# Patient Record
Sex: MALE | URBAN - METROPOLITAN AREA
[De-identification: names, ages, dates, MRNs, and addresses within clinical notes are randomized per-mention and may not be internally consistent; named-entity substitution may affect disease eponyms.]

---

## 2024-11-07 ENCOUNTER — OFFICE VISIT (OUTPATIENT)
Dept: URBAN - METROPOLITAN AREA CLINIC 19 | Facility: CLINIC | Age: 54
End: 2024-11-07
Payer: COMMERCIAL

## 2024-11-07 ENCOUNTER — DASHBOARD ENCOUNTERS (OUTPATIENT)
Age: 54
End: 2024-11-07

## 2024-11-07 VITALS
BODY MASS INDEX: 24.37 KG/M2 | TEMPERATURE: 98.4 F | SYSTOLIC BLOOD PRESSURE: 120 MMHG | HEIGHT: 72 IN | DIASTOLIC BLOOD PRESSURE: 70 MMHG | WEIGHT: 179.9 LBS

## 2024-11-07 DIAGNOSIS — R13.12 OROPHARYNGEAL DYSPHAGIA: ICD-10-CM

## 2024-11-07 PROBLEM — 71457002: Status: ACTIVE | Noted: 2024-11-07

## 2024-11-07 PROCEDURE — 994 AGA: Performed by: STUDENT IN AN ORGANIZED HEALTH CARE EDUCATION/TRAINING PROGRAM

## 2024-11-07 RX ORDER — NUTRITIONAL SUPPLEMENT/FIBER 0.05 G-1.5
AS DIRECTED LIQUID (ML) ORAL
OUTPATIENT
Start: 2024-11-07

## 2024-11-07 NOTE — HPI-TODAY'S VISIT:
54-year-old male with history of swelling around his left neck during evaluation by his primary care physician.  He had a soft tissue of the neck on 9/20/2024.  Showing a 17 x 12 mm lymph node with several hypodensities suggesting necrosis.  Adjacent smaller level 2A lymph node measuring 10 x 8 mm.  Additional left level 2A lymph node measuring 1.9 x 1.6 cm with central necrosis.  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  Patient is being seen by Dr. Restrepo for consideration of radiation the management of squamous cell.  Patient also have been referred to Dr. Bee for surgical evaluation as well as medical oncology .  Patient was seen by [place] cancer specialist on 10/29/2020 for.  There was a discussion at that time for PEG tube placement for anticipated   oropharyngeal cancer. Recent labs 10/29/2024 WBC 7 platelet 192 and hemoglobin 16 point Today patient denies any symptoms of dysphagia, nausea vomiting or abdominal pain.  He indicated that he had a discussion with his radiation oncologist as well as his medical oncologist that he would benefit from a PEG tube placement for nutrition.  He indicated that he will be getting multiple sessions of radiation.  His first radiation will be on Monday.  Patient denies any previous history of abdominal surgery.

## 2024-11-08 ENCOUNTER — OFFICE VISIT (OUTPATIENT)
Dept: URBAN - METROPOLITAN AREA MEDICAL CENTER 25 | Facility: MEDICAL CENTER | Age: 54
End: 2024-11-08
Payer: COMMERCIAL

## 2024-11-08 DIAGNOSIS — R13.12 DYSPHAGIA: ICD-10-CM

## 2024-11-08 PROCEDURE — 43246 EGD PLACE GASTROSTOMY TUBE: CPT | Performed by: INTERNAL MEDICINE

## 2024-11-08 RX ORDER — NUTRITIONAL SUPPLEMENT/FIBER 0.05 G-1.5
AS DIRECTED LIQUID (ML) ORAL
Status: ACTIVE | COMMUNITY
Start: 2024-11-07

## 2024-11-11 ENCOUNTER — TELEPHONE ENCOUNTER (OUTPATIENT)
Dept: URBAN - METROPOLITAN AREA CLINIC 19 | Facility: CLINIC | Age: 54
End: 2024-11-11

## 2025-02-25 ENCOUNTER — TELEPHONE ENCOUNTER (OUTPATIENT)
Dept: URBAN - METROPOLITAN AREA CLINIC 19 | Facility: CLINIC | Age: 55
End: 2025-02-25

## 2025-04-09 ENCOUNTER — TELEPHONE ENCOUNTER (OUTPATIENT)
Dept: URBAN - METROPOLITAN AREA CLINIC 19 | Facility: CLINIC | Age: 55
End: 2025-04-09

## 2025-04-15 ENCOUNTER — OFFICE VISIT (OUTPATIENT)
Dept: URBAN - METROPOLITAN AREA CLINIC 19 | Facility: CLINIC | Age: 55
End: 2025-04-15

## 2025-05-28 ENCOUNTER — OFFICE VISIT (OUTPATIENT)
Dept: URBAN - METROPOLITAN AREA CLINIC 19 | Facility: CLINIC | Age: 55
End: 2025-05-28

## 2025-05-30 ENCOUNTER — OFFICE VISIT (OUTPATIENT)
Dept: URBAN - METROPOLITAN AREA CLINIC 19 | Facility: CLINIC | Age: 55
End: 2025-05-30
Payer: COMMERCIAL

## 2025-05-30 DIAGNOSIS — K94.23 PEG TUBE MALFUNCTION: ICD-10-CM

## 2025-05-30 DIAGNOSIS — R13.12 OROPHARYNGEAL DYSPHAGIA: ICD-10-CM

## 2025-05-30 PROCEDURE — 99212 OFFICE O/P EST SF 10 MIN: CPT | Performed by: INTERNAL MEDICINE

## 2025-05-30 RX ORDER — NUTRITIONAL SUPPLEMENT/FIBER 0.05 G-1.5
AS DIRECTED LIQUID (ML) ORAL
Status: ACTIVE | COMMUNITY
Start: 2024-11-07

## 2025-05-30 NOTE — HPI-TODAY'S VISIT:
Mr.. 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 PEG tube placed in Nov 2024 . He has not used his PEG in 3 months and has no difficulty swallowing.

## 2025-05-30 NOTE — PHYSICAL EXAM GASTROINTESTINAL
Abdomen , soft, nontender, nondistended , no guarding or rigidity , no masses palpable , normal bowel sounds , Liver and Spleen,  no hepatosplenomegaly , liver nontender, PEG tube site C/D/I